# Patient Record
Sex: FEMALE | Race: WHITE
[De-identification: names, ages, dates, MRNs, and addresses within clinical notes are randomized per-mention and may not be internally consistent; named-entity substitution may affect disease eponyms.]

---

## 2017-08-04 ENCOUNTER — HOSPITAL ENCOUNTER (OUTPATIENT)
Dept: HOSPITAL 46 - DS | Age: 62
Discharge: HOME | End: 2017-08-04
Attending: INTERNAL MEDICINE
Payer: MEDICARE

## 2017-08-04 VITALS — HEIGHT: 61 IN | WEIGHT: 137.35 LBS | BODY MASS INDEX: 25.93 KG/M2

## 2017-08-04 DIAGNOSIS — G89.29: ICD-10-CM

## 2017-08-04 DIAGNOSIS — M19.90: ICD-10-CM

## 2017-08-04 DIAGNOSIS — F32.9: ICD-10-CM

## 2017-08-04 DIAGNOSIS — Z98.890: ICD-10-CM

## 2017-08-04 DIAGNOSIS — D61.818: Primary | ICD-10-CM

## 2017-08-04 DIAGNOSIS — Z90.89: ICD-10-CM

## 2017-08-04 DIAGNOSIS — M54.9: ICD-10-CM

## 2017-08-04 DIAGNOSIS — I10: ICD-10-CM

## 2017-08-04 PROCEDURE — 07DR3ZX EXTRACTION OF ILIAC BONE MARROW, PERCUTANEOUS APPROACH, DIAGNOSTIC: ICD-10-PCS | Performed by: INTERNAL MEDICINE

## 2017-08-11 NOTE — OR
[facility]
                                    [address] [place]  [address]
_________________________________________________________________________________________
                                                                 Signed   
 
 
DATE OF PROCEDURE: August 4, 2017. 
 
PROCEDURE: 
Left posterior iliac crest bone marrow biopsy and aspirate under conscious sedation.
 
After explaining the risks, benefits, and alternatives to bone marrow biopsy and
aspiration under conscious sedation for evaluation of pancytopenia and obtaining
informed written consent, the patient was brought to the endoscopy suite, where a
time-out was taken and the patient and procedure were correctly identified. She was
placed in the prone position. Conscious sedation was obtained with 100 mcg of
intravenous fentanyl and 5 mg of intravenous midazolam. The left posterior iliac crest
was prepped and draped in the usual sterile manner. Local anesthesia over the left
posterior iliac crest was obtained with 2 mL of 1% lidocaine and the left posterior
iliac crest bone marrow biopsy and aspirate were obtained without adverse effects.
 
_____________________________
Moreno Bro MD
 
RQ/Modl
DD: 08/04/2017 12:56:24
DT: 08/04/2017 19:23:55
Job #: [id number]
 
 
 
 
 
 
 
 
 
 
 
 
 
 
 
 
 
 
 
 
 
    Electronically Signed By: MORENO BRO MD  08/11/17 1644
_________________________________________________________________________________________
PATIENT NAME:     JIM KENNEDY                      
MEDICAL RECORD #: [id number]                     OPERATIVE REPORT              
          ACCT #: [id number]  
DATE OF BIRTH:   [birth year]                                       
PHYSICIAN:   MORENO BRO MD            REPORT #: [id number]
REPORT IS CONFIDENTIAL AND NOT TO BE RELEASED WITHOUT AUTHORIZATION

## 2019-02-08 ENCOUNTER — HOSPITAL ENCOUNTER (EMERGENCY)
Dept: HOSPITAL 46 - ED | Age: 64
Discharge: HOME | End: 2019-02-08
Payer: MEDICARE

## 2019-02-08 VITALS — HEIGHT: 61 IN | WEIGHT: 138.43 LBS | BODY MASS INDEX: 26.14 KG/M2

## 2019-02-08 DIAGNOSIS — Z79.899: ICD-10-CM

## 2019-02-08 DIAGNOSIS — F17.210: ICD-10-CM

## 2019-02-08 DIAGNOSIS — Z71.6: ICD-10-CM

## 2019-02-08 DIAGNOSIS — I10: ICD-10-CM

## 2019-02-08 DIAGNOSIS — D64.9: ICD-10-CM

## 2019-02-08 DIAGNOSIS — K72.90: ICD-10-CM

## 2019-02-08 DIAGNOSIS — F32.9: ICD-10-CM

## 2019-02-08 DIAGNOSIS — K64.4: ICD-10-CM

## 2019-02-08 DIAGNOSIS — K74.60: Primary | ICD-10-CM
